# Patient Record
Sex: FEMALE | Race: WHITE | URBAN - METROPOLITAN AREA
[De-identification: names, ages, dates, MRNs, and addresses within clinical notes are randomized per-mention and may not be internally consistent; named-entity substitution may affect disease eponyms.]

---

## 2024-10-08 ENCOUNTER — HOSPITAL ENCOUNTER (OUTPATIENT)
Age: 19
Setting detail: SPECIMEN
Discharge: HOME | End: 2024-10-08
Payer: COMMERCIAL

## 2024-10-08 ENCOUNTER — OFFICE VISIT (OUTPATIENT)
Dept: OBGYN | Age: 19
End: 2024-10-08
Payer: COMMERCIAL

## 2024-10-08 VITALS
WEIGHT: 135 LBS | HEIGHT: 63 IN | BODY MASS INDEX: 23.92 KG/M2 | DIASTOLIC BLOOD PRESSURE: 90 MMHG | SYSTOLIC BLOOD PRESSURE: 138 MMHG | HEART RATE: 72 BPM

## 2024-10-08 DIAGNOSIS — Z12.4 CERVICAL CANCER SCREENING: ICD-10-CM

## 2024-10-08 DIAGNOSIS — N94.6 DYSMENORRHEA: ICD-10-CM

## 2024-10-08 DIAGNOSIS — N92.6 IRREGULAR MENSES: ICD-10-CM

## 2024-10-08 DIAGNOSIS — Z01.419 WELL WOMAN EXAM WITH ROUTINE GYNECOLOGICAL EXAM: Primary | ICD-10-CM

## 2024-10-08 PROCEDURE — 99385 PREV VISIT NEW AGE 18-39: CPT | Performed by: STUDENT IN AN ORGANIZED HEALTH CARE EDUCATION/TRAINING PROGRAM

## 2024-10-08 PROCEDURE — G0123 SCREEN CERV/VAG THIN LAYER: HCPCS

## 2024-10-08 SDOH — ECONOMIC STABILITY: FOOD INSECURITY: WITHIN THE PAST 12 MONTHS, THE FOOD YOU BOUGHT JUST DIDN'T LAST AND YOU DIDN'T HAVE MONEY TO GET MORE.: NEVER TRUE

## 2024-10-08 SDOH — ECONOMIC STABILITY: FOOD INSECURITY: WITHIN THE PAST 12 MONTHS, YOU WORRIED THAT YOUR FOOD WOULD RUN OUT BEFORE YOU GOT MONEY TO BUY MORE.: NEVER TRUE

## 2024-10-08 SDOH — ECONOMIC STABILITY: INCOME INSECURITY: HOW HARD IS IT FOR YOU TO PAY FOR THE VERY BASICS LIKE FOOD, HOUSING, MEDICAL CARE, AND HEATING?: NOT HARD AT ALL

## 2024-10-08 ASSESSMENT — PATIENT HEALTH QUESTIONNAIRE - PHQ9
1. LITTLE INTEREST OR PLEASURE IN DOING THINGS: NOT AT ALL
SUM OF ALL RESPONSES TO PHQ QUESTIONS 1-9: 0
2. FEELING DOWN, DEPRESSED OR HOPELESS: NOT AT ALL
SUM OF ALL RESPONSES TO PHQ QUESTIONS 1-9: 0
SUM OF ALL RESPONSES TO PHQ9 QUESTIONS 1 & 2: 0

## 2024-10-08 NOTE — PROGRESS NOTES
Department of Obstetrics and Gynecology  Well Woman Office Visit  Name:  Emma Ruth Lischerelli   CSN: [id number]    : 2005   Age: 19 y.o.      Chief Complaint   Patient presents with    New Patient     Patient presents today as new gyn. Pap- never. LMP: 2024, menses irregular, states rages from 28-45 days between. Hx dysmenorrhea and menorrhagia. Not currently sexually active, b/c-none. No c/o       SUBJECTIVE:  Emma Ruth Lischerelli is a 19 y.o.  who presents for a routine well woman exam. Reports irregular cycles. Says they used to be irregular, then became regular and now are irregular again. Does endorse dysmenorrhea. Has tried ibuprofen.     GYN HX:    Menses:   Patient's last menstrual period was 2024 (approximate).  Periods are irregular, hx of menorrhagia and dysmenorrhea  Cervical cancer screening:  Last pap smear never.  No history of abnormal pap smears.  Contraception:  none.   Infections:  No history of STDs or PID.  Sexual history:  Currently is not sexually active.  Denies issues of sexual dysfunction.  Denies current or past issues with physical, emotional, or sexual abuse.  Paternal grandmother had breast cancer    Patient's medications, allergies, past medical, surgical, social and family histories were reviewed and updated as appropriate.    Past Medical History:   Diagnosis Date    Dysmenorrhea     Irregular menses     Menorrhagia        No past surgical history on file.    Social History     Socioeconomic History    Marital status: Unknown     Spouse name: Not on file    Number of children: Not on file    Years of education: Not on file    Highest education level: Not on file   Occupational History    Not on file   Tobacco Use    Smoking status: Never    Smokeless tobacco: Never   Vaping Use    Vaping status: Never Used   Substance and Sexual Activity    Alcohol use: Never    Drug use: Never    Sexual activity: Never   Other Topics Concern    Not on file   Social

## 2024-10-14 LAB — GYNECOLOGY CYTOLOGY REPORT: NORMAL

## 2025-02-08 SDOH — ECONOMIC STABILITY: TRANSPORTATION INSECURITY
IN THE PAST 12 MONTHS, HAS LACK OF TRANSPORTATION KEPT YOU FROM MEETINGS, WORK, OR FROM GETTING THINGS NEEDED FOR DAILY LIVING?: NO

## 2025-02-08 SDOH — ECONOMIC STABILITY: TRANSPORTATION INSECURITY
IN THE PAST 12 MONTHS, HAS THE LACK OF TRANSPORTATION KEPT YOU FROM MEDICAL APPOINTMENTS OR FROM GETTING MEDICATIONS?: NO

## 2025-02-08 SDOH — ECONOMIC STABILITY: FOOD INSECURITY: WITHIN THE PAST 12 MONTHS, THE FOOD YOU BOUGHT JUST DIDN'T LAST AND YOU DIDN'T HAVE MONEY TO GET MORE.: NEVER TRUE

## 2025-02-08 SDOH — ECONOMIC STABILITY: INCOME INSECURITY: IN THE LAST 12 MONTHS, WAS THERE A TIME WHEN YOU WERE NOT ABLE TO PAY THE MORTGAGE OR RENT ON TIME?: NO

## 2025-02-08 SDOH — ECONOMIC STABILITY: FOOD INSECURITY: WITHIN THE PAST 12 MONTHS, YOU WORRIED THAT YOUR FOOD WOULD RUN OUT BEFORE YOU GOT MONEY TO BUY MORE.: NEVER TRUE

## 2025-02-08 ASSESSMENT — PATIENT HEALTH QUESTIONNAIRE - PHQ9
SUM OF ALL RESPONSES TO PHQ9 QUESTIONS 1 & 2: 0
SUM OF ALL RESPONSES TO PHQ QUESTIONS 1-9: 0
SUM OF ALL RESPONSES TO PHQ QUESTIONS 1-9: 0
2. FEELING DOWN, DEPRESSED OR HOPELESS: NOT AT ALL
SUM OF ALL RESPONSES TO PHQ QUESTIONS 1-9: 0
SUM OF ALL RESPONSES TO PHQ9 QUESTIONS 1 & 2: 0
1. LITTLE INTEREST OR PLEASURE IN DOING THINGS: NOT AT ALL
SUM OF ALL RESPONSES TO PHQ QUESTIONS 1-9: 0
1. LITTLE INTEREST OR PLEASURE IN DOING THINGS: NOT AT ALL
2. FEELING DOWN, DEPRESSED OR HOPELESS: NOT AT ALL

## 2025-02-11 ENCOUNTER — OFFICE VISIT (OUTPATIENT)
Dept: OBGYN | Age: 20
End: 2025-02-11
Payer: COMMERCIAL

## 2025-02-11 VITALS
DIASTOLIC BLOOD PRESSURE: 80 MMHG | SYSTOLIC BLOOD PRESSURE: 150 MMHG | HEIGHT: 63 IN | WEIGHT: 139 LBS | HEART RATE: 87 BPM | BODY MASS INDEX: 24.63 KG/M2

## 2025-02-11 DIAGNOSIS — N94.6 DYSMENORRHEA: ICD-10-CM

## 2025-02-11 DIAGNOSIS — N92.6 IRREGULAR MENSES: Primary | ICD-10-CM

## 2025-02-11 DIAGNOSIS — R03.0 ELEVATED BLOOD PRESSURE READING: ICD-10-CM

## 2025-02-11 PROCEDURE — 99214 OFFICE O/P EST MOD 30 MIN: CPT | Performed by: STUDENT IN AN ORGANIZED HEALTH CARE EDUCATION/TRAINING PROGRAM

## 2025-02-11 RX ORDER — ETONOGESTREL AND ETHINYL ESTRADIOL VAGINAL RING .015; .12 MG/D; MG/D
1 RING VAGINAL SEE ADMIN INSTRUCTIONS
Qty: 1 EACH | Refills: 11 | Status: SHIPPED | OUTPATIENT
Start: 2025-02-11

## 2025-02-11 NOTE — PROGRESS NOTES
Partner Violence: Not on file   Housing Stability: Low Risk  (2/8/2025)    Housing Stability Vital Sign     Unable to Pay for Housing in the Last Year: No     Number of Times Moved in the Last Year: 0     Homeless in the Last Year: No       Family History   Problem Relation Age of Onset    Breast Cancer Paternal Grandmother        Current Outpatient Medications   Medication Sig Dispense Refill    etonogestrel-ethinyl estradiol (NUVARING) 0.12-0.015 MG/24HR vaginal ring Place 1 each vaginally See Admin Instructions Insert 1 ring monthly. Remove after 3 weeks, then insert new ring in 1 week. 1 each 11     No current facility-administered medications for this visit.       No Known Allergies    ROS:  Constitutional: negative for fever, chills, unexplained weight loss  Respiratory: negative for cough, wheezing, SOB  Cardiovascular: negative for chest pain, palpitations, chest pressure/discomfort  Gastrointestinal: negative for nausea, vomiting, constipation, diarrhea, abdominal pain, change in bowel habits  Genitourinary: negative for dysuria, hematuria, trouble voiding, or incontinence; negative for vaginal discharge, itching, odor, or lesions  Breast: negative for breast lump, breast tenderness, nipple discharge, rash and skin color change  Musculoskeletal: negative for joint swelling or pain  Neurological: negative for headaches or dizziness  Behavioral/Psych: negative for depression/anxiety      OBJECTIVE:   BP (!) 150/80 (Site: Right Upper Arm, Position: Sitting, Cuff Size: Medium Adult)   Pulse 87   Ht 1.6 m (5' 3\")   Wt 63 kg (139 lb)   LMP 01/23/2024   BMI 24.62 kg/m²     Constitutional: healthy appearing, well nourished, well developed  Psychiatric: oriented to time/place/person, active and alert, normal mood/affect  Skin: no rashes, no lesions  Abdomen: soft, no distention, no tenderness, no masses, no CVA tenderness  RESP: good respiratory effort  EXT: normal, atraumatic, no cyanosis or

## 2025-03-13 DIAGNOSIS — N94.6 DYSMENORRHEA: ICD-10-CM

## 2025-03-13 DIAGNOSIS — N92.6 IRREGULAR MENSES: ICD-10-CM

## 2025-03-13 RX ORDER — ETONOGESTREL AND ETHINYL ESTRADIOL VAGINAL RING .015; .12 MG/D; MG/D
1 RING VAGINAL SEE ADMIN INSTRUCTIONS
Qty: 1 EACH | Refills: 11 | OUTPATIENT
Start: 2025-03-13

## 2025-04-16 ENCOUNTER — TELEPHONE (OUTPATIENT)
Dept: OBGYN | Age: 20
End: 2025-04-16

## 2025-04-16 NOTE — TELEPHONE ENCOUNTER
Pt called stating that she has had normal menses since starting the Nuvaring. Pt states she is on day 16 of her cycle and started her menses today. Pt did state that the last few weeks she has removed the Nuvaring a couple hours each day. Pt wanted to know if that would cause her cycle to start and how she needs to proceed w/ the Nuvaring. Please advise.